# Patient Record
Sex: MALE | Race: WHITE | Employment: OTHER | URBAN - METROPOLITAN AREA
[De-identification: names, ages, dates, MRNs, and addresses within clinical notes are randomized per-mention and may not be internally consistent; named-entity substitution may affect disease eponyms.]

---

## 2018-03-27 PROBLEM — R73.03 PREDIABETES: Status: ACTIVE | Noted: 2018-03-27

## 2019-04-23 PROBLEM — R41.3 SHORT-TERM MEMORY LOSS: Status: ACTIVE | Noted: 2019-04-23

## 2019-05-06 ENCOUNTER — HOSPITAL ENCOUNTER (OUTPATIENT)
Dept: MRI IMAGING | Age: 79
Discharge: HOME | End: 2019-05-06
Attending: PSYCHIATRY & NEUROLOGY
Payer: MEDICARE

## 2019-05-06 DIAGNOSIS — R41.3 SHORT-TERM MEMORY LOSS: ICD-10-CM

## 2019-05-06 DIAGNOSIS — G11.9 ATAXIA DUE TO CEREBELLAR DEGENERATION (HCC): ICD-10-CM

## 2019-05-06 PROCEDURE — 70551 MRI BRAIN STEM W/O DYE: CPT

## 2019-05-12 NOTE — PROGRESS NOTES
Please notify patient that his MRI brain showed cerebellar and brain atrophy. No stroke, no brain tumor. Chronic white matter changes were not significant for his age.

## 2019-10-02 ENCOUNTER — HOME HEALTH ADMISSION (OUTPATIENT)
Dept: HOME HEALTH SERVICES | Facility: HOME HEALTH | Age: 79
End: 2019-10-02

## 2020-05-21 ENCOUNTER — HOME HEALTH ADMISSION (OUTPATIENT)
Dept: HOME HEALTH SERVICES | Facility: HOME HEALTH | Age: 80
End: 2020-05-21
Payer: MEDICARE

## 2020-05-22 ENCOUNTER — HOME CARE VISIT (OUTPATIENT)
Dept: SCHEDULING | Facility: HOME HEALTH | Age: 80
End: 2020-05-22
Payer: MEDICARE

## 2020-05-22 VITALS
OXYGEN SATURATION: 96 % | RESPIRATION RATE: 17 BRPM | HEART RATE: 96 BPM | SYSTOLIC BLOOD PRESSURE: 135 MMHG | DIASTOLIC BLOOD PRESSURE: 61 MMHG | TEMPERATURE: 98.2 F

## 2020-05-22 PROCEDURE — G0151 HHCP-SERV OF PT,EA 15 MIN: HCPCS

## 2020-05-22 PROCEDURE — 400013 HH SOC

## 2020-05-22 PROCEDURE — 3331090002 HH PPS REVENUE DEBIT

## 2020-05-22 PROCEDURE — 3331090001 HH PPS REVENUE CREDIT

## 2020-05-23 PROCEDURE — 3331090001 HH PPS REVENUE CREDIT

## 2020-05-23 PROCEDURE — 3331090002 HH PPS REVENUE DEBIT

## 2020-05-24 PROCEDURE — 3331090001 HH PPS REVENUE CREDIT

## 2020-05-24 PROCEDURE — 3331090002 HH PPS REVENUE DEBIT

## 2020-05-25 PROCEDURE — 3331090002 HH PPS REVENUE DEBIT

## 2020-05-25 PROCEDURE — 3331090001 HH PPS REVENUE CREDIT

## 2020-05-26 PROCEDURE — 3331090001 HH PPS REVENUE CREDIT

## 2020-05-26 PROCEDURE — 3331090002 HH PPS REVENUE DEBIT

## 2020-05-27 ENCOUNTER — HOME CARE VISIT (OUTPATIENT)
Dept: SCHEDULING | Facility: HOME HEALTH | Age: 80
End: 2020-05-27
Payer: MEDICARE

## 2020-05-27 VITALS
HEART RATE: 78 BPM | RESPIRATION RATE: 18 BRPM | DIASTOLIC BLOOD PRESSURE: 80 MMHG | SYSTOLIC BLOOD PRESSURE: 140 MMHG | TEMPERATURE: 97 F | OXYGEN SATURATION: 96 %

## 2020-05-27 PROCEDURE — 3331090001 HH PPS REVENUE CREDIT

## 2020-05-27 PROCEDURE — 3331090002 HH PPS REVENUE DEBIT

## 2020-05-27 PROCEDURE — G0157 HHC PT ASSISTANT EA 15: HCPCS

## 2020-05-28 PROCEDURE — 3331090001 HH PPS REVENUE CREDIT

## 2020-05-28 PROCEDURE — 3331090002 HH PPS REVENUE DEBIT

## 2020-05-29 ENCOUNTER — HOME CARE VISIT (OUTPATIENT)
Dept: SCHEDULING | Facility: HOME HEALTH | Age: 80
End: 2020-05-29
Payer: MEDICARE

## 2020-05-29 VITALS
RESPIRATION RATE: 18 BRPM | SYSTOLIC BLOOD PRESSURE: 110 MMHG | TEMPERATURE: 97.5 F | OXYGEN SATURATION: 98 % | DIASTOLIC BLOOD PRESSURE: 70 MMHG | HEART RATE: 78 BPM

## 2020-05-29 PROCEDURE — G0157 HHC PT ASSISTANT EA 15: HCPCS

## 2020-05-29 PROCEDURE — 3331090001 HH PPS REVENUE CREDIT

## 2020-05-29 PROCEDURE — 3331090002 HH PPS REVENUE DEBIT

## 2020-05-30 PROCEDURE — 3331090001 HH PPS REVENUE CREDIT

## 2020-05-30 PROCEDURE — 3331090002 HH PPS REVENUE DEBIT

## 2020-05-31 PROCEDURE — 3331090002 HH PPS REVENUE DEBIT

## 2020-05-31 PROCEDURE — 3331090001 HH PPS REVENUE CREDIT

## 2020-06-01 ENCOUNTER — HOME CARE VISIT (OUTPATIENT)
Dept: SCHEDULING | Facility: HOME HEALTH | Age: 80
End: 2020-06-01
Payer: MEDICARE

## 2020-06-01 VITALS
OXYGEN SATURATION: 99 % | SYSTOLIC BLOOD PRESSURE: 140 MMHG | TEMPERATURE: 97 F | RESPIRATION RATE: 18 BRPM | DIASTOLIC BLOOD PRESSURE: 70 MMHG | HEART RATE: 90 BPM

## 2020-06-01 PROCEDURE — 3331090002 HH PPS REVENUE DEBIT

## 2020-06-01 PROCEDURE — 3331090001 HH PPS REVENUE CREDIT

## 2020-06-01 PROCEDURE — G0157 HHC PT ASSISTANT EA 15: HCPCS

## 2020-06-02 PROCEDURE — 3331090002 HH PPS REVENUE DEBIT

## 2020-06-02 PROCEDURE — 3331090001 HH PPS REVENUE CREDIT

## 2020-06-03 PROCEDURE — 3331090002 HH PPS REVENUE DEBIT

## 2020-06-03 PROCEDURE — 3331090001 HH PPS REVENUE CREDIT

## 2020-06-04 ENCOUNTER — HOME CARE VISIT (OUTPATIENT)
Dept: SCHEDULING | Facility: HOME HEALTH | Age: 80
End: 2020-06-04
Payer: MEDICARE

## 2020-06-04 PROCEDURE — G0157 HHC PT ASSISTANT EA 15: HCPCS

## 2020-06-04 PROCEDURE — 3331090002 HH PPS REVENUE DEBIT

## 2020-06-04 PROCEDURE — 3331090001 HH PPS REVENUE CREDIT

## 2020-06-05 PROCEDURE — 3331090001 HH PPS REVENUE CREDIT

## 2020-06-05 PROCEDURE — 3331090002 HH PPS REVENUE DEBIT

## 2020-06-06 PROCEDURE — 3331090002 HH PPS REVENUE DEBIT

## 2020-06-06 PROCEDURE — 3331090001 HH PPS REVENUE CREDIT

## 2020-06-07 PROCEDURE — 3331090001 HH PPS REVENUE CREDIT

## 2020-06-07 PROCEDURE — 3331090002 HH PPS REVENUE DEBIT

## 2020-06-08 PROCEDURE — 3331090002 HH PPS REVENUE DEBIT

## 2020-06-08 PROCEDURE — 3331090001 HH PPS REVENUE CREDIT

## 2020-06-09 PROCEDURE — 3331090002 HH PPS REVENUE DEBIT

## 2020-06-09 PROCEDURE — 3331090001 HH PPS REVENUE CREDIT

## 2020-06-10 ENCOUNTER — HOME CARE VISIT (OUTPATIENT)
Dept: SCHEDULING | Facility: HOME HEALTH | Age: 80
End: 2020-06-10
Payer: MEDICARE

## 2020-06-10 VITALS
SYSTOLIC BLOOD PRESSURE: 110 MMHG | DIASTOLIC BLOOD PRESSURE: 70 MMHG | TEMPERATURE: 97 F | OXYGEN SATURATION: 97 % | RESPIRATION RATE: 18 BRPM | HEART RATE: 72 BPM

## 2020-06-10 PROCEDURE — G0157 HHC PT ASSISTANT EA 15: HCPCS

## 2020-06-10 PROCEDURE — 3331090001 HH PPS REVENUE CREDIT

## 2020-06-10 PROCEDURE — 3331090002 HH PPS REVENUE DEBIT

## 2020-06-11 PROCEDURE — 3331090001 HH PPS REVENUE CREDIT

## 2020-06-11 PROCEDURE — 3331090002 HH PPS REVENUE DEBIT

## 2020-06-12 ENCOUNTER — HOME CARE VISIT (OUTPATIENT)
Dept: SCHEDULING | Facility: HOME HEALTH | Age: 80
End: 2020-06-12
Payer: MEDICARE

## 2020-06-12 VITALS
RESPIRATION RATE: 17 BRPM | HEART RATE: 76 BPM | TEMPERATURE: 97.5 F | OXYGEN SATURATION: 98 % | SYSTOLIC BLOOD PRESSURE: 130 MMHG | DIASTOLIC BLOOD PRESSURE: 78 MMHG

## 2020-06-12 PROCEDURE — 3331090002 HH PPS REVENUE DEBIT

## 2020-06-12 PROCEDURE — G0157 HHC PT ASSISTANT EA 15: HCPCS

## 2020-06-12 PROCEDURE — 3331090001 HH PPS REVENUE CREDIT

## 2020-06-12 NOTE — PROGRESS NOTES
Pt. will be ready for dc next week. He has signed [facility]  Pt still has problems with balance. He is amb with rollator in the home and outside. He is able to manage going up and down the steps using the rail. He cont to have occassional lob with activity. He understands and agrees with dc.

## 2020-06-13 PROCEDURE — 3331090001 HH PPS REVENUE CREDIT

## 2020-06-13 PROCEDURE — 3331090002 HH PPS REVENUE DEBIT

## 2020-06-14 PROCEDURE — 3331090002 HH PPS REVENUE DEBIT

## 2020-06-14 PROCEDURE — 3331090001 HH PPS REVENUE CREDIT

## 2020-06-15 PROCEDURE — 3331090002 HH PPS REVENUE DEBIT

## 2020-06-15 PROCEDURE — 3331090001 HH PPS REVENUE CREDIT

## 2020-06-16 ENCOUNTER — HOME CARE VISIT (OUTPATIENT)
Dept: SCHEDULING | Facility: HOME HEALTH | Age: 80
End: 2020-06-16
Payer: MEDICARE

## 2020-06-16 PROCEDURE — 3331090002 HH PPS REVENUE DEBIT

## 2020-06-16 PROCEDURE — 3331090001 HH PPS REVENUE CREDIT

## 2020-06-16 PROCEDURE — G0151 HHCP-SERV OF PT,EA 15 MIN: HCPCS

## 2020-06-17 PROCEDURE — 3331090001 HH PPS REVENUE CREDIT

## 2020-06-17 PROCEDURE — 3331090002 HH PPS REVENUE DEBIT

## 2020-06-18 VITALS
DIASTOLIC BLOOD PRESSURE: 78 MMHG | HEART RATE: 87 BPM | SYSTOLIC BLOOD PRESSURE: 126 MMHG | RESPIRATION RATE: 18 BRPM | TEMPERATURE: 97.7 F

## 2021-09-16 PROBLEM — G30.9 ALZHEIMER'S DISEASE, UNSPECIFIED (CODE) (HCC): Status: ACTIVE | Noted: 2021-09-16

## 2022-03-18 PROBLEM — R73.03 PREDIABETES: Status: ACTIVE | Noted: 2018-03-27

## 2022-03-18 PROBLEM — R41.3 SHORT-TERM MEMORY LOSS: Status: ACTIVE | Noted: 2019-04-23

## 2022-03-19 PROBLEM — G30.9 ALZHEIMER'S DISEASE, UNSPECIFIED (CODE) (HCC): Status: ACTIVE | Noted: 2021-09-16

## 2022-07-25 ENCOUNTER — OFFICE VISIT (OUTPATIENT)
Dept: INTERNAL MEDICINE CLINIC | Facility: CLINIC | Age: 82
End: 2022-07-25
Payer: MEDICARE

## 2022-07-25 VITALS
WEIGHT: 174 LBS | HEART RATE: 81 BPM | SYSTOLIC BLOOD PRESSURE: 139 MMHG | BODY MASS INDEX: 27.97 KG/M2 | OXYGEN SATURATION: 97 % | DIASTOLIC BLOOD PRESSURE: 69 MMHG | HEIGHT: 66 IN | TEMPERATURE: 98 F

## 2022-07-25 DIAGNOSIS — G30.9 ALZHEIMER'S DISEASE, UNSPECIFIED (CODE) (HCC): ICD-10-CM

## 2022-07-25 DIAGNOSIS — N18.31 STAGE 3A CHRONIC KIDNEY DISEASE (HCC): ICD-10-CM

## 2022-07-25 DIAGNOSIS — E78.49 OTHER HYPERLIPIDEMIA: ICD-10-CM

## 2022-07-25 DIAGNOSIS — I10 ESSENTIAL HYPERTENSION: Primary | ICD-10-CM

## 2022-07-25 DIAGNOSIS — F41.9 ANXIETY: ICD-10-CM

## 2022-07-25 PROCEDURE — 1036F TOBACCO NON-USER: CPT | Performed by: INTERNAL MEDICINE

## 2022-07-25 PROCEDURE — G8417 CALC BMI ABV UP PARAM F/U: HCPCS | Performed by: INTERNAL MEDICINE

## 2022-07-25 PROCEDURE — 99214 OFFICE O/P EST MOD 30 MIN: CPT | Performed by: INTERNAL MEDICINE

## 2022-07-25 PROCEDURE — 1123F ACP DISCUSS/DSCN MKR DOCD: CPT | Performed by: INTERNAL MEDICINE

## 2022-07-25 PROCEDURE — G8427 DOCREV CUR MEDS BY ELIG CLIN: HCPCS | Performed by: INTERNAL MEDICINE

## 2022-07-25 RX ORDER — CITALOPRAM 20 MG/1
20 TABLET ORAL
Qty: 90 TABLET | Refills: 1 | Status: SHIPPED | OUTPATIENT
Start: 2022-07-25

## 2022-07-25 RX ORDER — MEMANTINE HYDROCHLORIDE 10 MG/1
10 TABLET ORAL 2 TIMES DAILY
Qty: 180 TABLET | Refills: 1 | Status: SHIPPED | OUTPATIENT
Start: 2022-07-25

## 2022-07-25 RX ORDER — LISINOPRIL 20 MG/1
20 TABLET ORAL DAILY
Qty: 90 TABLET | Refills: 1 | Status: SHIPPED | OUTPATIENT
Start: 2022-07-25

## 2022-07-25 RX ORDER — SIMVASTATIN 40 MG
40 TABLET ORAL
Qty: 90 TABLET | Refills: 1 | Status: SHIPPED | OUTPATIENT
Start: 2022-07-25

## 2022-07-25 ASSESSMENT — ANXIETY QUESTIONNAIRES
6. BECOMING EASILY ANNOYED OR IRRITABLE: 1
5. BEING SO RESTLESS THAT IT IS HARD TO SIT STILL: 0
4. TROUBLE RELAXING: 0
7. FEELING AFRAID AS IF SOMETHING AWFUL MIGHT HAPPEN: 1
3. WORRYING TOO MUCH ABOUT DIFFERENT THINGS: 3
2. NOT BEING ABLE TO STOP OR CONTROL WORRYING: 3
1. FEELING NERVOUS, ANXIOUS, OR ON EDGE: 0
GAD7 TOTAL SCORE: 8
IF YOU CHECKED OFF ANY PROBLEMS ON THIS QUESTIONNAIRE, HOW DIFFICULT HAVE THESE PROBLEMS MADE IT FOR YOU TO DO YOUR WORK, TAKE CARE OF THINGS AT HOME, OR GET ALONG WITH OTHER PEOPLE: NOT DIFFICULT AT ALL

## 2022-07-25 ASSESSMENT — PATIENT HEALTH QUESTIONNAIRE - PHQ9
SUM OF ALL RESPONSES TO PHQ QUESTIONS 1-9: 0
1. LITTLE INTEREST OR PLEASURE IN DOING THINGS: 0
SUM OF ALL RESPONSES TO PHQ9 QUESTIONS 1 & 2: 0
2. FEELING DOWN, DEPRESSED OR HOPELESS: 0
SUM OF ALL RESPONSES TO PHQ QUESTIONS 1-9: 0

## 2022-07-25 NOTE — PROGRESS NOTES
Megan Lozano M.D. Internal Medicine  [address], [address]   Office : [phone]  Fax : [phone]    Chief Complaint   Patient presents with    Hypertension     The pt is directed to take the Namenda BID, but the pts daughter states he has been forgetting the 2nd dose. History of Present Illness:  Ender Montgomery is a [de-identified] y.o. male. HPI    Hypertension  Patient is in for Hypertension which is stable. Diet and Lifestyle: generally follows a low sodium diet  exercises sporadically  nonsmoker  Home BP Monitoring: is not measured at home . Patient's recent blood pressures were   BP Readings from Last 3 Encounters:   07/25/22 139/69   03/14/22 120/67   12/14/21 135/63   . taking medications as instructed, no medication side effects noted, no TIA's, no chest pain on exertion, no dyspnea on exertion, no swelling of ankles. Cardiac risk factors consist of dyslipidemia, hypertension, and male gender. Hyperlipidemia  Patient is in for follow-up for hyperlipidemia. Diet and Lifestyle: generally follows a low fat low cholesterol diet. Risk factors for vascular disease consist of hyperlipidemia and hypertension. Last LDL was   Lab Results   Component Value Date    LDLCALC 91 12/14/2021   . Last ALT was   Lab Results   Component Value Date/Time    ALT 14 12/14/2021 03:59 PM   .  No muscle aches. Cognitive Impairment/B12 deficiency/Probable Alzheimers dementia  Slowly progressive.   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    Chronic renal disease, stage III [facility])     Depression     Former smoker     GERD (gastroesophageal reflux disease)     tums PRN    Hypercholesterolemia     Hypertension     Morbid obesity ([address]     Short-term memory loss 2019     Past Surgical History:  Past Surgical History:   Procedure Laterality Date    ANKLE FRACTURE SURGERY Right     APPENDECTOMY      COLONOSCOPY  2015    COLONOSCOPY  last 2/16/15    Chyna--no polyps--7-10 year recall    KNEE ARTHROSCOPY Right 's    TOTAL KNEE ARTHROPLASTY Bilateral      Allergies: Allergies   Allergen Reactions    Sulfa Antibiotics Itching     Pt reports he's never had an issue with Sulfa drugs. Medications:   Current Outpatient Medications   Medication Sig Dispense Refill    Cyanocobalamin (VITAMIN B12 PO) Take 500 mcg by mouth daily      citalopram (CELEXA) 20 MG tablet Take 1 tablet by mouth daily (before lunch) TAKE 1 TABLET BY MOUTH DAILY BEFORE LUNCH 90 tablet 1    simvastatin (ZOCOR) 40 MG tablet Take 1 tablet by mouth daily (before lunch) TAKE 1 TABLET BY MOUTH DAILY BEFORE LUNCH 90 tablet 1    memantine (NAMENDA) 10 MG tablet Take 1 tablet by mouth in the morning and 1 tablet before bedtime. 180 tablet 1    lisinopril (PRINIVIL;ZESTRIL) 20 MG tablet Take 1 tablet by mouth in the morning. 90 tablet 1    Multiple Vitamin (MULTIVITAMIN PO) Take by mouth daily       No current facility-administered medications for this visit.      Social History:  Social History     Tobacco Use    Smoking status: Former     Packs/day: 4.00     Types: Cigarettes     Quit date: 5/10/1997     Years since quittin.2    Smokeless tobacco: Former     Quit date: 5/10/1990   Substance Use Topics    Alcohol use: No     Family History  Family History   Problem Relation Age of Onset    Cancer Brother         Sinus; now, left eye    Psychiatric Disorder Mother     Rheum Arthritis Daughter     Hypertension Father        Review of Systems   Unable to perform ROS: Dementia       Vital Signs  /69 (Site: Right Upper Arm, Position: Sitting, Cuff Size: Medium Adult)   Pulse 81   Temp 98 °F (36.7 °C) (Temporal)   Ht 5' 6.3\" (1.684 m)   Wt 174 lb (78.9 kg)   SpO2 97%   BMI 27.83 kg/m²   Body mass index is 27.83 kg/m². Physical Exam  Vitals reviewed. Constitutional:       General: He is not in acute distress. Appearance: Normal appearance. He is not ill-appearing. HENT:      Head: Normocephalic and atraumatic. Eyes:      General: No scleral icterus. Extraocular Movements: Extraocular movements intact. Conjunctiva/sclera: Conjunctivae normal.   Neck:      Vascular: No carotid bruit. Cardiovascular:      Rate and Rhythm: Normal rate and regular rhythm. Heart sounds: Normal heart sounds. No murmur heard. Pulmonary:      Effort: Pulmonary effort is normal.      Breath sounds: Normal breath sounds. Musculoskeletal:         General: No swelling. Skin:     Coloration: Skin is not jaundiced. Findings: Bruising present. No rash. Neurological:      General: No focal deficit present. Mental Status: He is alert. Mental status is at baseline. Cranial Nerves: No cranial nerve deficit. Motor: Weakness present. Gait: Gait abnormal.   Psychiatric:         Mood and Affect: Mood normal.         Behavior: Behavior normal.         Assessment/Plan:  Fiorella Villagran was seen today for hypertension. Diagnoses and all orders for this visit:    Essential hypertension  -     lisinopril (PRINIVIL;ZESTRIL) 20 MG tablet; Take 1 tablet by mouth in the morning. Other hyperlipidemia  -     simvastatin (ZOCOR) 40 MG tablet; Take 1 tablet by mouth daily (before lunch) TAKE 1 TABLET BY MOUTH DAILY BEFORE LUNCH    Alzheimer's disease, unspecified (CODE) ([facility])  -     memantine (NAMENDA) 10 MG tablet; Take 1 tablet by mouth in the morning and 1 tablet before bedtime. Depression, unspecified depression type    Anxiety  -     citalopram (CELEXA) 20 MG tablet;  Take 1 tablet by mouth daily (before lunch) TAKE 1 TABLET BY MOUTH DAILY BEFORE LUNCH    Stage 3a chronic kidney disease ([facility] [address]      Return in about 4 months (around 11/25/2022), or if symptoms worsen or fail to improve.   __  Wanda Myrick M.D.

## 2022-08-22 ENCOUNTER — PATIENT MESSAGE (OUTPATIENT)
Dept: INTERNAL MEDICINE CLINIC | Facility: CLINIC | Age: 82
End: 2022-08-22

## 2022-08-22 NOTE — LETTER
[address] [facility] [place] [address]  Phone: [phone]  Fax: [phone]         To Whom it May Concern,    Mr Deb Paz suffers from significant cognitive impairment, likely Alzheimer's Dementia, and it may effect his ability to make decisions

## 2022-08-22 NOTE — LETTER
[address] [facility] [address]  Phone: [phone]  Fax: [phone]         To Whom it May Concern,    Mr Mulugeta Rodriguez suffers from significant cognitive impairment, likely Alzheimer's Dementia, and it may adversely effect his ability to make decisions or complete documentation.         Sincerely,        Tanmay Simmons M.D.

## 2022-08-22 NOTE — LETTER
[address] [facility] [address]  Phone: [phone]  Fax: [phone]     Regarding:  JESS [address]ura 1940    To Whom it May Concern,    Mr Cortney Saldana suffers from significant cognitive impairment, likely Alzheimer's Dementia, and it may adversely effect his ability to make decisions or complete documentation.         Sincerely,        Naina Jordan M.D.

## 2022-08-23 NOTE — TELEPHONE ENCOUNTER
Angie Malloy MA 8/22/2022 4:51 PM EDT      ----- Message -----  From: Margie Whitt  Sent: 8/22/2022 1:58 PM EDT  To: [facility] Clinical Staff  Subject: Sonya  dementia question     Dr Islas Hint: This is Geoffery Codnahum, [facility] daughter. I am writing to ask if you could kindly provide a letter stating that my father does indeed have dementia or Alzheimers as his recent visit indicates. My mother passed away in March and my father, Sonya Smyth was named executor in the will. As such, he has to fill out and sign paperwork for probate. I cant even get him to sign anything as he asks me to just sign his name. I have POA but because the POA does not have specific language the probate court has asked that I get a letter from you that he is not capable of handling the executors duties due to dementia. . I was named executor after my father. Could you please type a short letter on your letterhead and sign it stating such? I will be happy to come to your office and  the letter.      Thank you  Ephraim Hardy  [phone]

## 2022-08-23 NOTE — TELEPHONE ENCOUNTER
I can write a letter stating the medical facts that he has cognitive impairment which is probably due to Alzheimer's dementia. The capacity to make decisions ( I.e. competency)  is a legal decision that a court would have to adjudicate.   Sometimes a visit with a forensic psychologist is very helpful in these matters

## 2022-11-28 ENCOUNTER — OFFICE VISIT (OUTPATIENT)
Dept: INTERNAL MEDICINE CLINIC | Facility: CLINIC | Age: 82
End: 2022-11-28
Payer: MEDICARE

## 2022-11-28 VITALS
WEIGHT: 178 LBS | BODY MASS INDEX: 28.61 KG/M2 | OXYGEN SATURATION: 97 % | TEMPERATURE: 98.6 F | SYSTOLIC BLOOD PRESSURE: 120 MMHG | HEIGHT: 66 IN | DIASTOLIC BLOOD PRESSURE: 65 MMHG | HEART RATE: 79 BPM

## 2022-11-28 DIAGNOSIS — I10 ESSENTIAL HYPERTENSION: Primary | ICD-10-CM

## 2022-11-28 DIAGNOSIS — F41.9 ANXIETY: ICD-10-CM

## 2022-11-28 DIAGNOSIS — E78.49 OTHER HYPERLIPIDEMIA: ICD-10-CM

## 2022-11-28 DIAGNOSIS — G30.9 ALZHEIMER'S DISEASE, UNSPECIFIED (CODE) (HCC): ICD-10-CM

## 2022-11-28 PROCEDURE — G8417 CALC BMI ABV UP PARAM F/U: HCPCS | Performed by: INTERNAL MEDICINE

## 2022-11-28 PROCEDURE — 3074F SYST BP LT 130 MM HG: CPT | Performed by: INTERNAL MEDICINE

## 2022-11-28 PROCEDURE — 1123F ACP DISCUSS/DSCN MKR DOCD: CPT | Performed by: INTERNAL MEDICINE

## 2022-11-28 PROCEDURE — 3078F DIAST BP <80 MM HG: CPT | Performed by: INTERNAL MEDICINE

## 2022-11-28 PROCEDURE — 1036F TOBACCO NON-USER: CPT | Performed by: INTERNAL MEDICINE

## 2022-11-28 PROCEDURE — G8484 FLU IMMUNIZE NO ADMIN: HCPCS | Performed by: INTERNAL MEDICINE

## 2022-11-28 PROCEDURE — G8427 DOCREV CUR MEDS BY ELIG CLIN: HCPCS | Performed by: INTERNAL MEDICINE

## 2022-11-28 PROCEDURE — 99214 OFFICE O/P EST MOD 30 MIN: CPT | Performed by: INTERNAL MEDICINE

## 2022-11-28 RX ORDER — LISINOPRIL 20 MG/1
20 TABLET ORAL DAILY
Qty: 90 TABLET | Refills: 1 | Status: SHIPPED | OUTPATIENT
Start: 2022-11-28

## 2022-11-28 RX ORDER — CITALOPRAM 20 MG/1
TABLET ORAL
Qty: 90 TABLET | Refills: 1 | Status: SHIPPED | OUTPATIENT
Start: 2022-11-28

## 2022-11-28 RX ORDER — SIMVASTATIN 40 MG
TABLET ORAL
Qty: 90 TABLET | Refills: 1 | Status: SHIPPED | OUTPATIENT
Start: 2022-11-28

## 2022-11-28 RX ORDER — MEMANTINE HYDROCHLORIDE 10 MG/1
10 TABLET ORAL 2 TIMES DAILY
Qty: 180 TABLET | Refills: 1 | Status: SHIPPED | OUTPATIENT
Start: 2022-11-28

## 2022-11-28 ASSESSMENT — PATIENT HEALTH QUESTIONNAIRE - PHQ9
3. TROUBLE FALLING OR STAYING ASLEEP: 0
6. FEELING BAD ABOUT YOURSELF - OR THAT YOU ARE A FAILURE OR HAVE LET YOURSELF OR YOUR FAMILY DOWN: 0
1. LITTLE INTEREST OR PLEASURE IN DOING THINGS: 0
SUM OF ALL RESPONSES TO PHQ9 QUESTIONS 1 & 2: 0
7. TROUBLE CONCENTRATING ON THINGS, SUCH AS READING THE NEWSPAPER OR WATCHING TELEVISION: 0
2. FEELING DOWN, DEPRESSED OR HOPELESS: 0
SUM OF ALL RESPONSES TO PHQ QUESTIONS 1-9: 0
2. FEELING DOWN, DEPRESSED OR HOPELESS: 0
5. POOR APPETITE OR OVEREATING: 0
5. POOR APPETITE OR OVEREATING: 0
4. FEELING TIRED OR HAVING LITTLE ENERGY: 0
SUM OF ALL RESPONSES TO PHQ QUESTIONS 1-9: 0
SUM OF ALL RESPONSES TO PHQ QUESTIONS 1-9: 0
SUM OF ALL RESPONSES TO PHQ9 QUESTIONS 1 & 2: 0
SUM OF ALL RESPONSES TO PHQ QUESTIONS 1-9: 0
SUM OF ALL RESPONSES TO PHQ QUESTIONS 1-9: 0
3. TROUBLE FALLING OR STAYING ASLEEP: 0
10. IF YOU CHECKED OFF ANY PROBLEMS, HOW DIFFICULT HAVE THESE PROBLEMS MADE IT FOR YOU TO DO YOUR WORK, TAKE CARE OF THINGS AT HOME, OR GET ALONG WITH OTHER PEOPLE: 0
10. IF YOU CHECKED OFF ANY PROBLEMS, HOW DIFFICULT HAVE THESE PROBLEMS MADE IT FOR YOU TO DO YOUR WORK, TAKE CARE OF THINGS AT HOME, OR GET ALONG WITH OTHER PEOPLE: 0
8. MOVING OR SPEAKING SO SLOWLY THAT OTHER PEOPLE COULD HAVE NOTICED. OR THE OPPOSITE, BEING SO FIGETY OR RESTLESS THAT YOU HAVE BEEN MOVING AROUND A LOT MORE THAN USUAL: 0
SUM OF ALL RESPONSES TO PHQ QUESTIONS 1-9: 0
1. LITTLE INTEREST OR PLEASURE IN DOING THINGS: 0
SUM OF ALL RESPONSES TO PHQ QUESTIONS 1-9: 0
9. THOUGHTS THAT YOU WOULD BE BETTER OFF DEAD, OR OF HURTING YOURSELF: 0
7. TROUBLE CONCENTRATING ON THINGS, SUCH AS READING THE NEWSPAPER OR WATCHING TELEVISION: 0
4. FEELING TIRED OR HAVING LITTLE ENERGY: 0
SUM OF ALL RESPONSES TO PHQ QUESTIONS 1-9: 0
6. FEELING BAD ABOUT YOURSELF - OR THAT YOU ARE A FAILURE OR HAVE LET YOURSELF OR YOUR FAMILY DOWN: 0
9. THOUGHTS THAT YOU WOULD BE BETTER OFF DEAD, OR OF HURTING YOURSELF: 0
8. MOVING OR SPEAKING SO SLOWLY THAT OTHER PEOPLE COULD HAVE NOTICED. OR THE OPPOSITE, BEING SO FIGETY OR RESTLESS THAT YOU HAVE BEEN MOVING AROUND A LOT MORE THAN USUAL: 0

## 2022-11-28 NOTE — PROGRESS NOTES
Jamilah Forte M.D. Internal Medicine  [address] [facility] [address] , [address]   Office : [phone]  Fax : [phone]    Chief Complaint   Patient presents with    Hypertension     ROV follow up       History of Present Illness:  Jamila Zamudio is a [de-identified] y.o. male. HPI    Hypertension  Patient is in for Hypertension which is stable. Diet and Lifestyle: generally follows a low sodium diet  exercises sporadically  nonsmoker  Home BP Monitoring: is not measured at home . Patient's recent blood pressures were   BP Readings from Last 3 Encounters:   11/28/22 120/65   07/25/22 139/69   03/14/22 120/67   . taking medications as instructed, no medication side effects noted, no TIA's, no chest pain on exertion, no dyspnea on exertion, no swelling of ankles. Cardiac risk factors consist of dyslipidemia, hypertension, and male gender. Hyperlipidemia  Patient is in for follow-up for hyperlipidemia. Diet and Lifestyle: generally follows a low fat low cholesterol diet. Risk factors for vascular disease consist of hyperlipidemia and hypertension. Last LDL was   Lab Results   Component Value Date    LDLCALC 91 12/14/2021   . Last ALT was   Lab Results   Component Value Date/Time    ALT 14 12/14/2021 03:59 PM   .  No muscle aches. Cognitive Impairment/B12 deficiency/Probable Alzheimers dementia  Slowly progressive.   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 ([facility] [address] 2008    Balance disorder     cerebellar ataxia, neurology    Cataracts, bilateral 10/06/2016    Dr Darrel Jj - [facility]    Chronic renal disease, stage III ([facility] [address]     Depression     Former smoker     GERD (gastroesophageal reflux disease)     tums PRN    Hypercholesterolemia     Hypertension     Morbid obesity ([address]     Short-term memory loss 2019     Past Surgical History:  Past Surgical History:   Procedure Laterality Date    ANKLE FRACTURE SURGERY Right     APPENDECTOMY      COLONOSCOPY  2015    COLONOSCOPY  last 2/16/15    Chyna--no polyps--7-10 year recall    KNEE ARTHROSCOPY Right     TOTAL KNEE ARTHROPLASTY Bilateral      Allergies: Allergies   Allergen Reactions    Sulfa Antibiotics Itching     Pt reports he's never had an issue with Sulfa drugs. Medications:   Current Outpatient Medications   Medication Sig Dispense Refill    citalopram (CELEXA) 20 MG tablet TAKE 1 TABLET BY MOUTH DAILY BEFORE LUNCH 90 tablet 1    lisinopril (PRINIVIL;ZESTRIL) 20 MG tablet Take 1 tablet by mouth daily 90 tablet 1    memantine (NAMENDA) 10 MG tablet Take 1 tablet by mouth 2 times daily 180 tablet 1    simvastatin (ZOCOR) 40 MG tablet TAKE 1 TABLET BY MOUTH DAILY BEFORE LUNCH 90 tablet 1    Cyanocobalamin (VITAMIN B12 PO) Take 500 mcg by mouth daily      Multiple Vitamin (MULTIVITAMIN PO) Take by mouth daily       No current facility-administered medications for this visit. Social History:  Social History     Tobacco Use    Smoking status: Former     Packs/day: 4.00     Types: Cigarettes     Quit date: 5/10/1997     Years since quittin.5    Smokeless tobacco: Former     Quit date: 5/10/1990   Substance Use Topics    Alcohol use: No     Family History  Family History   Problem Relation Age of Onset    Cancer Brother         Sinus; now, left eye    Psychiatric Disorder Mother     Rheum Arthritis Daughter     Hypertension Father        Review of Systems   Unable to perform ROS: Dementia       Vital Signs  /65 (Site: Left Upper Arm, Position: Sitting)   Pulse 79   Temp 98.6 °F (37 °C) (Temporal)   Ht 5' 6\" (1.676 m)   Wt 178 lb (80.7 kg)   SpO2 97%   BMI 28.73 kg/m²   Body mass index is 28.73 kg/m².     Physical Exam  Vitals reviewed. Constitutional:       General: He is not in acute distress. Appearance: Normal appearance. He is not ill-appearing. HENT:      Head: Normocephalic and atraumatic. Eyes:      General: No scleral icterus. Conjunctiva/sclera: Conjunctivae normal.   Neck:      Vascular: No carotid bruit. Cardiovascular:      Rate and Rhythm: Normal rate and regular rhythm. Heart sounds: Normal heart sounds. No murmur heard. Pulmonary:      Effort: Pulmonary effort is normal.      Breath sounds: Normal breath sounds. Musculoskeletal:         General: No swelling. Skin:     Coloration: Skin is not jaundiced. Findings: No rash. Neurological:      General: No focal deficit present. Mental Status: He is alert. Mental status is at baseline. Cranial Nerves: No cranial nerve deficit. Motor: Weakness present. Gait: Gait abnormal.   Psychiatric:         Mood and Affect: Mood normal.         Behavior: Behavior normal.         Assessment/Plan:  Elham Lyman was seen today for hypertension. Diagnoses and all orders for this visit:    Essential hypertension  -     lisinopril (PRINIVIL;ZESTRIL) 20 MG tablet; Take 1 tablet by mouth daily    Anxiety  -     citalopram (CELEXA) 20 MG tablet; TAKE 1 TABLET BY MOUTH DAILY BEFORE LUNCH    Alzheimer's disease, unspecified (CODE) ([facility])  -     memantine (NAMENDA) 10 MG tablet; Take 1 tablet by mouth 2 times daily    Other hyperlipidemia  -     simvastatin (ZOCOR) 40 MG tablet; TAKE 1 TABLET BY MOUTH DAILY BEFORE LUNCH      Return in about 4 months (around 3/28/2023), or if symptoms worsen or fail to improve.   __  Robbie Gee M.D.

## 2023-03-29 ENCOUNTER — OFFICE VISIT (OUTPATIENT)
Dept: INTERNAL MEDICINE CLINIC | Facility: CLINIC | Age: 83
End: 2023-03-29
Payer: MEDICARE

## 2023-03-29 VITALS
DIASTOLIC BLOOD PRESSURE: 67 MMHG | HEART RATE: 99 BPM | WEIGHT: 173 LBS | BODY MASS INDEX: 27.8 KG/M2 | HEIGHT: 66 IN | SYSTOLIC BLOOD PRESSURE: 115 MMHG | TEMPERATURE: 98.4 F | OXYGEN SATURATION: 98 %

## 2023-03-29 DIAGNOSIS — E78.49 OTHER HYPERLIPIDEMIA: ICD-10-CM

## 2023-03-29 DIAGNOSIS — G30.9 ALZHEIMER'S DISEASE, UNSPECIFIED (CODE) (HCC): ICD-10-CM

## 2023-03-29 DIAGNOSIS — N18.31 STAGE 3A CHRONIC KIDNEY DISEASE (HCC): ICD-10-CM

## 2023-03-29 DIAGNOSIS — G11.9 HEREDITARY ATAXIA, UNSPECIFIED (HCC): ICD-10-CM

## 2023-03-29 DIAGNOSIS — F41.9 ANXIETY: ICD-10-CM

## 2023-03-29 DIAGNOSIS — I10 ESSENTIAL HYPERTENSION: ICD-10-CM

## 2023-03-29 DIAGNOSIS — I10 ESSENTIAL HYPERTENSION: Primary | ICD-10-CM

## 2023-03-29 LAB
BASOPHILS # BLD: 0.1 K/UL (ref 0–0.2)
BASOPHILS NFR BLD: 1 % (ref 0–2)
DIFFERENTIAL METHOD BLD: ABNORMAL
EOSINOPHIL # BLD: 0.1 K/UL (ref 0–0.8)
EOSINOPHIL NFR BLD: 1 % (ref 0.5–7.8)
ERYTHROCYTE [DISTWIDTH] IN BLOOD BY AUTOMATED COUNT: 12.3 % (ref 11.9–14.6)
HCT VFR BLD AUTO: 48 % (ref 41.1–50.3)
HGB BLD-MCNC: 15.7 G/DL (ref 13.6–17.2)
IMM GRANULOCYTES # BLD AUTO: 0 K/UL (ref 0–0.5)
IMM GRANULOCYTES NFR BLD AUTO: 0 % (ref 0–5)
LYMPHOCYTES # BLD: 2 K/UL (ref 0.5–4.6)
LYMPHOCYTES NFR BLD: 33 % (ref 13–44)
MCH RBC QN AUTO: 32.3 PG (ref 26.1–32.9)
MCHC RBC AUTO-ENTMCNC: 32.7 G/DL (ref 31.4–35)
MCV RBC AUTO: 98.8 FL (ref 82–102)
MONOCYTES # BLD: 0.8 K/UL (ref 0.1–1.3)
MONOCYTES NFR BLD: 13 % (ref 4–12)
NEUTS SEG # BLD: 3.1 K/UL (ref 1.7–8.2)
NEUTS SEG NFR BLD: 52 % (ref 43–78)
NRBC # BLD: 0 K/UL (ref 0–0.2)
PLATELET # BLD AUTO: 340 K/UL (ref 150–450)
PMV BLD AUTO: 9.8 FL (ref 9.4–12.3)
RBC # BLD AUTO: 4.86 M/UL (ref 4.23–5.6)
WBC # BLD AUTO: 6 K/UL (ref 4.3–11.1)

## 2023-03-29 PROCEDURE — 3074F SYST BP LT 130 MM HG: CPT | Performed by: INTERNAL MEDICINE

## 2023-03-29 PROCEDURE — 3078F DIAST BP <80 MM HG: CPT | Performed by: INTERNAL MEDICINE

## 2023-03-29 PROCEDURE — 1123F ACP DISCUSS/DSCN MKR DOCD: CPT | Performed by: INTERNAL MEDICINE

## 2023-03-29 PROCEDURE — 99214 OFFICE O/P EST MOD 30 MIN: CPT | Performed by: INTERNAL MEDICINE

## 2023-03-29 PROCEDURE — G8417 CALC BMI ABV UP PARAM F/U: HCPCS | Performed by: INTERNAL MEDICINE

## 2023-03-29 PROCEDURE — G8484 FLU IMMUNIZE NO ADMIN: HCPCS | Performed by: INTERNAL MEDICINE

## 2023-03-29 PROCEDURE — G8427 DOCREV CUR MEDS BY ELIG CLIN: HCPCS | Performed by: INTERNAL MEDICINE

## 2023-03-29 PROCEDURE — 1036F TOBACCO NON-USER: CPT | Performed by: INTERNAL MEDICINE

## 2023-03-29 RX ORDER — CITALOPRAM 20 MG/1
TABLET ORAL
Qty: 90 TABLET | Refills: 1 | Status: SHIPPED | OUTPATIENT
Start: 2023-03-29

## 2023-03-29 RX ORDER — SIMVASTATIN 40 MG
TABLET ORAL
Qty: 90 TABLET | Refills: 1 | Status: SHIPPED | OUTPATIENT
Start: 2023-03-29

## 2023-03-29 RX ORDER — MEMANTINE HYDROCHLORIDE 10 MG/1
10 TABLET ORAL 2 TIMES DAILY
Qty: 180 TABLET | Refills: 1 | Status: SHIPPED | OUTPATIENT
Start: 2023-03-29

## 2023-03-29 RX ORDER — LISINOPRIL 20 MG/1
20 TABLET ORAL DAILY
Qty: 90 TABLET | Refills: 1 | Status: SHIPPED | OUTPATIENT
Start: 2023-03-29

## 2023-03-29 SDOH — ECONOMIC STABILITY: FOOD INSECURITY: WITHIN THE PAST 12 MONTHS, THE FOOD YOU BOUGHT JUST DIDN'T LAST AND YOU DIDN'T HAVE MONEY TO GET MORE.: NEVER TRUE

## 2023-03-29 SDOH — ECONOMIC STABILITY: HOUSING INSECURITY
IN THE LAST 12 MONTHS, WAS THERE A TIME WHEN YOU DID NOT HAVE A STEADY PLACE TO SLEEP OR SLEPT IN A SHELTER (INCLUDING NOW)?: NO

## 2023-03-29 SDOH — ECONOMIC STABILITY: INCOME INSECURITY: HOW HARD IS IT FOR YOU TO PAY FOR THE VERY BASICS LIKE FOOD, HOUSING, MEDICAL CARE, AND HEATING?: NOT HARD AT ALL

## 2023-03-29 SDOH — ECONOMIC STABILITY: FOOD INSECURITY: WITHIN THE PAST 12 MONTHS, YOU WORRIED THAT YOUR FOOD WOULD RUN OUT BEFORE YOU GOT MONEY TO BUY MORE.: NEVER TRUE

## 2023-03-29 ASSESSMENT — ANXIETY QUESTIONNAIRES
1. FEELING NERVOUS, ANXIOUS, OR ON EDGE: 1
GAD7 TOTAL SCORE: 1
7. FEELING AFRAID AS IF SOMETHING AWFUL MIGHT HAPPEN: 0
3. WORRYING TOO MUCH ABOUT DIFFERENT THINGS: 0
IF YOU CHECKED OFF ANY PROBLEMS ON THIS QUESTIONNAIRE, HOW DIFFICULT HAVE THESE PROBLEMS MADE IT FOR YOU TO DO YOUR WORK, TAKE CARE OF THINGS AT HOME, OR GET ALONG WITH OTHER PEOPLE: NOT DIFFICULT AT ALL
2. NOT BEING ABLE TO STOP OR CONTROL WORRYING: 0
4. TROUBLE RELAXING: 0
5. BEING SO RESTLESS THAT IT IS HARD TO SIT STILL: 0
6. BECOMING EASILY ANNOYED OR IRRITABLE: 0

## 2023-03-29 ASSESSMENT — PATIENT HEALTH QUESTIONNAIRE - PHQ9
3. TROUBLE FALLING OR STAYING ASLEEP: 1
SUM OF ALL RESPONSES TO PHQ QUESTIONS 1-9: 3
8. MOVING OR SPEAKING SO SLOWLY THAT OTHER PEOPLE COULD HAVE NOTICED. OR THE OPPOSITE, BEING SO FIGETY OR RESTLESS THAT YOU HAVE BEEN MOVING AROUND A LOT MORE THAN USUAL: 0
SUM OF ALL RESPONSES TO PHQ QUESTIONS 1-9: 3
5. POOR APPETITE OR OVEREATING: 0
9. THOUGHTS THAT YOU WOULD BE BETTER OFF DEAD, OR OF HURTING YOURSELF: 0
1. LITTLE INTEREST OR PLEASURE IN DOING THINGS: 1
2. FEELING DOWN, DEPRESSED OR HOPELESS: 1
6. FEELING BAD ABOUT YOURSELF - OR THAT YOU ARE A FAILURE OR HAVE LET YOURSELF OR YOUR FAMILY DOWN: 0
SUM OF ALL RESPONSES TO PHQ QUESTIONS 1-9: 3
SUM OF ALL RESPONSES TO PHQ QUESTIONS 1-9: 3
4. FEELING TIRED OR HAVING LITTLE ENERGY: 0
7. TROUBLE CONCENTRATING ON THINGS, SUCH AS READING THE NEWSPAPER OR WATCHING TELEVISION: 0
SUM OF ALL RESPONSES TO PHQ9 QUESTIONS 1 & 2: 2

## 2023-03-29 ASSESSMENT — ENCOUNTER SYMPTOMS: SHORTNESS OF BREATH: 0

## 2023-03-29 NOTE — PROGRESS NOTES
cerebellar degeneration [facility])     Balance disorder     cerebellar ataxia, neurology    Cataracts, bilateral 10/06/2016    Dr Lily Gomez - [facility]    Chronic renal disease, stage III ([facility] [address]     Depression     Former smoker     GERD (gastroesophageal reflux disease)     tums PRN    Hypercholesterolemia     Hypertension     Morbid obesity ([facility] [address]     Short-term memory loss 2019     Past Surgical History:  Past Surgical History:   Procedure Laterality Date    ANKLE FRACTURE SURGERY Right     APPENDECTOMY      COLONOSCOPY  2015    COLONOSCOPY  last 2/16/15    Chyna--no polyps--7-10 year recall    KNEE ARTHROSCOPY Right     TOTAL KNEE ARTHROPLASTY Bilateral      Allergies: Allergies   Allergen Reactions    Sulfa Antibiotics Itching     Pt reports he's never had an issue with Sulfa drugs. Medications:   Current Outpatient Medications   Medication Sig Dispense Refill    citalopram (CELEXA) 20 MG tablet TAKE 1 TABLET BY MOUTH DAILY BEFORE LUNCH 90 tablet 1    lisinopril (PRINIVIL;ZESTRIL) 20 MG tablet Take 1 tablet by mouth daily 90 tablet 1    memantine (NAMENDA) 10 MG tablet Take 1 tablet by mouth 2 times daily 180 tablet 1    simvastatin (ZOCOR) 40 MG tablet TAKE 1 TABLET BY MOUTH DAILY BEFORE LUNCH 90 tablet 1    Cyanocobalamin (VITAMIN B12 PO) Take 500 mcg by mouth daily      Multiple Vitamin (MULTIVITAMIN PO) Take by mouth daily       No current facility-administered medications for this visit.      Social History:  Social History     Tobacco Use    Smoking status: Former     Packs/day: 4.00     Years: 15.00     Pack years: 60.00     Types: Cigarettes     Quit date: 5/10/1997     Years since quittin.9    Smokeless tobacco: Former     Quit date: 5/10/1990   Substance Use Topics    Alcohol use: No     Family History  Family History   Problem Relation Age of Onset    Cancer Brother         Sinus; now, left eye    Psychiatric Disorder Mother     Rheum Arthritis Daughter

## 2023-03-30 LAB
ALBUMIN SERPL-MCNC: 3.8 G/DL (ref 3.2–4.6)
ALBUMIN/GLOB SERPL: 1.2 (ref 0.4–1.6)
ALP SERPL-CCNC: 97 U/L (ref 50–136)
ALT SERPL-CCNC: 15 U/L (ref 12–65)
ANION GAP SERPL CALC-SCNC: 7 MMOL/L (ref 2–11)
AST SERPL-CCNC: 21 U/L (ref 15–37)
BILIRUB DIRECT SERPL-MCNC: 0.4 MG/DL
BILIRUB SERPL-MCNC: 1 MG/DL (ref 0.2–1.1)
BUN SERPL-MCNC: 14 MG/DL (ref 8–23)
CALCIUM SERPL-MCNC: 9.5 MG/DL (ref 8.3–10.4)
CHLORIDE SERPL-SCNC: 106 MMOL/L (ref 101–110)
CHOLEST SERPL-MCNC: 195 MG/DL
CO2 SERPL-SCNC: 26 MMOL/L (ref 21–32)
CREAT SERPL-MCNC: 1.7 MG/DL (ref 0.8–1.5)
GLOBULIN SER CALC-MCNC: 3.1 G/DL (ref 2.8–4.5)
GLUCOSE SERPL-MCNC: 114 MG/DL (ref 65–100)
HDLC SERPL-MCNC: 57 MG/DL (ref 40–60)
HDLC SERPL: 3.4
LDLC SERPL CALC-MCNC: 113 MG/DL
POTASSIUM SERPL-SCNC: 4.4 MMOL/L (ref 3.5–5.1)
PROT SERPL-MCNC: 6.9 G/DL (ref 6.3–8.2)
SODIUM SERPL-SCNC: 139 MMOL/L (ref 133–143)
TRIGL SERPL-MCNC: 125 MG/DL (ref 35–150)
VLDLC SERPL CALC-MCNC: 25 MG/DL (ref 6–23)

## 2023-03-31 ENCOUNTER — PATIENT MESSAGE (OUTPATIENT)
Dept: INTERNAL MEDICINE CLINIC | Facility: CLINIC | Age: 83
End: 2023-03-31

## 2023-04-03 RX ORDER — CIPROFLOXACIN 250 MG/1
250 TABLET, FILM COATED ORAL 2 TIMES DAILY
Qty: 14 TABLET | Refills: 0 | Status: SHIPPED | OUTPATIENT
Start: 2023-04-03 | End: 2023-04-10

## 2023-06-06 ENCOUNTER — NURSE TRIAGE (OUTPATIENT)
Dept: OTHER | Facility: CLINIC | Age: 83
End: 2023-06-06

## 2023-06-06 NOTE — TELEPHONE ENCOUNTER
Location of patient: [facility] [place], operated by [facility]    Received call from Omar Beverly at [facility] [employer] ([email or web address] She states daughter Cayden Woods called. Cayden Woods got a call from sitter that patient seems more off balance, is thinking Cayden Woods is in the room and that someone is at the door. Ofelia requests call back. Called Cayden Woods back. \" Limited triage due to caller not with patient\"    Subjective: Caller states \"trying to make appt for dad, he has alzheimer's and cerebellar ataxia. He told the sitter Sunday that he thought I was there and I wasn't. He's been more unsteady on his feet. \"     Current Symptoms: seems to be more confused. Concerned about UTI, hx stage 3 kidney disease. He skinned his elbow from fall twice 6/5/23 and once today  he did hit his head on 5/30/23, not on blood thinners. Onset: 3 days ago; gradual    Pain Severity: 0/10; N/A; none    Temperature: denies by unknown method    What has been tried: has a sitter. O2 sats good, vitals good. Recommended disposition: Go to ED Now    Care advice provided, patient verbalizes understanding; denies any other questions or concerns; instructed to call back for any new or worsening symptoms. Patient/caller agrees to proceed to ER  Emergency Department    Attention Provider: Thank you for allowing me to participate in the care of your patient. The patient was connected to triage in response to information provided to the ECC/PSC. Please do not respond through this encounter as the response is not directed to a shared pool.       Reason for Disposition   [1] Worsening confusion AND [2] new-onset (hours to 3 days)    Protocols used: Dementia Symptoms and Questions-ADULT-